# Patient Record
Sex: FEMALE
[De-identification: names, ages, dates, MRNs, and addresses within clinical notes are randomized per-mention and may not be internally consistent; named-entity substitution may affect disease eponyms.]

---

## 2018-04-23 PROBLEM — Z00.00 ENCOUNTER FOR PREVENTIVE HEALTH EXAMINATION: Status: ACTIVE | Noted: 2018-04-23

## 2018-04-30 ENCOUNTER — APPOINTMENT (OUTPATIENT)
Dept: ORTHOPEDIC SURGERY | Facility: CLINIC | Age: 78
End: 2018-04-30
Payer: MEDICARE

## 2018-04-30 VITALS
BODY MASS INDEX: 33.13 KG/M2 | HEART RATE: 55 BPM | DIASTOLIC BLOOD PRESSURE: 68 MMHG | SYSTOLIC BLOOD PRESSURE: 162 MMHG | HEIGHT: 62 IN | WEIGHT: 180 LBS

## 2018-04-30 DIAGNOSIS — Z87.39 PERSONAL HISTORY OF OTHER DISEASES OF THE MUSCULOSKELETAL SYSTEM AND CONNECTIVE TISSUE: ICD-10-CM

## 2018-04-30 PROCEDURE — 73564 X-RAY EXAM KNEE 4 OR MORE: CPT | Mod: 50

## 2018-04-30 PROCEDURE — 99214 OFFICE O/P EST MOD 30 MIN: CPT

## 2018-04-30 RX ORDER — LEVOTHYROXINE SODIUM 0.11 MG/1
112 TABLET ORAL
Qty: 30 | Refills: 0 | Status: ACTIVE | COMMUNITY
Start: 2017-11-02

## 2018-04-30 RX ORDER — ATENOLOL 50 MG/1
50 TABLET ORAL
Qty: 90 | Refills: 0 | Status: ACTIVE | COMMUNITY
Start: 2018-04-23

## 2018-04-30 RX ORDER — LEVOTHYROXINE SODIUM 50 UG/1
50 TABLET ORAL
Refills: 0 | Status: ACTIVE | COMMUNITY

## 2018-04-30 RX ORDER — CLONAZEPAM 0.5 MG/1
0.5 TABLET ORAL
Qty: 120 | Refills: 0 | Status: ACTIVE | COMMUNITY
Start: 2017-11-16

## 2018-04-30 RX ORDER — PREDNISONE 10 MG/1
10 TABLET ORAL
Qty: 90 | Refills: 0 | Status: ACTIVE | COMMUNITY
Start: 2018-01-23

## 2018-04-30 RX ORDER — ATENOLOL 25 MG/1
25 TABLET ORAL
Refills: 0 | Status: ACTIVE | COMMUNITY

## 2018-04-30 RX ORDER — ATENOLOL 25 MG/1
25 TABLET ORAL
Qty: 60 | Refills: 0 | Status: ACTIVE | COMMUNITY
Start: 2017-12-06

## 2018-04-30 RX ORDER — HYDROCHLOROTHIAZIDE 25 MG/1
25 TABLET ORAL
Qty: 36 | Refills: 0 | Status: ACTIVE | COMMUNITY
Start: 2017-12-06

## 2018-04-30 RX ORDER — METOPROLOL SUCCINATE 50 MG/1
50 TABLET, EXTENDED RELEASE ORAL
Qty: 30 | Refills: 0 | Status: ACTIVE | COMMUNITY
Start: 2017-09-28

## 2018-04-30 RX ORDER — OMEPRAZOLE 40 MG/1
40 CAPSULE, DELAYED RELEASE ORAL
Qty: 30 | Refills: 0 | Status: ACTIVE | COMMUNITY
Start: 2017-09-26

## 2018-06-04 ENCOUNTER — APPOINTMENT (OUTPATIENT)
Dept: ORTHOPEDIC SURGERY | Facility: CLINIC | Age: 78
End: 2018-06-04
Payer: MEDICARE

## 2018-06-04 VITALS — BODY MASS INDEX: 33.13 KG/M2 | WEIGHT: 180 LBS | HEIGHT: 62 IN

## 2018-06-04 PROCEDURE — 20610 DRAIN/INJ JOINT/BURSA W/O US: CPT | Mod: 50

## 2018-06-11 ENCOUNTER — RX RENEWAL (OUTPATIENT)
Age: 78
End: 2018-06-11

## 2018-06-11 ENCOUNTER — APPOINTMENT (OUTPATIENT)
Dept: ORTHOPEDIC SURGERY | Facility: CLINIC | Age: 78
End: 2018-06-11
Payer: MEDICARE

## 2018-06-11 PROCEDURE — 20610 DRAIN/INJ JOINT/BURSA W/O US: CPT | Mod: 50

## 2018-06-12 ENCOUNTER — RX RENEWAL (OUTPATIENT)
Age: 78
End: 2018-06-12

## 2018-06-12 RX ORDER — MELOXICAM 15 MG/1
15 TABLET ORAL DAILY
Qty: 30 | Refills: 2 | Status: ACTIVE | COMMUNITY
Start: 2017-11-22

## 2018-06-12 RX ORDER — MELOXICAM 7.5 MG/1
7.5 TABLET ORAL DAILY
Qty: 90 | Refills: 0 | Status: DISCONTINUED | COMMUNITY
Start: 2018-06-11 | End: 2018-06-12

## 2018-06-18 ENCOUNTER — APPOINTMENT (OUTPATIENT)
Dept: ORTHOPEDIC SURGERY | Facility: CLINIC | Age: 78
End: 2018-06-18
Payer: MEDICARE

## 2018-06-18 PROCEDURE — 20610 DRAIN/INJ JOINT/BURSA W/O US: CPT | Mod: 50

## 2018-11-19 ENCOUNTER — APPOINTMENT (OUTPATIENT)
Dept: ORTHOPEDIC SURGERY | Facility: CLINIC | Age: 78
End: 2018-11-19
Payer: MEDICARE

## 2018-11-19 VITALS
BODY MASS INDEX: 33.13 KG/M2 | WEIGHT: 180 LBS | DIASTOLIC BLOOD PRESSURE: 81 MMHG | SYSTOLIC BLOOD PRESSURE: 129 MMHG | HEART RATE: 48 BPM | HEIGHT: 62 IN

## 2018-11-19 PROCEDURE — 73564 X-RAY EXAM KNEE 4 OR MORE: CPT | Mod: 50

## 2018-11-19 PROCEDURE — 99213 OFFICE O/P EST LOW 20 MIN: CPT

## 2019-11-04 ENCOUNTER — APPOINTMENT (OUTPATIENT)
Dept: ORTHOPEDIC SURGERY | Facility: CLINIC | Age: 79
End: 2019-11-04
Payer: MEDICARE

## 2019-11-04 VITALS — BODY MASS INDEX: 33.13 KG/M2 | HEIGHT: 62 IN | WEIGHT: 180 LBS

## 2019-11-04 DIAGNOSIS — M17.0 BILATERAL PRIMARY OSTEOARTHRITIS OF KNEE: ICD-10-CM

## 2019-11-04 DIAGNOSIS — M25.562 PAIN IN RIGHT KNEE: ICD-10-CM

## 2019-11-04 DIAGNOSIS — M25.561 PAIN IN RIGHT KNEE: ICD-10-CM

## 2019-11-04 PROCEDURE — 99213 OFFICE O/P EST LOW 20 MIN: CPT

## 2019-11-04 PROCEDURE — 73564 X-RAY EXAM KNEE 4 OR MORE: CPT | Mod: 50

## 2019-11-04 NOTE — ADDENDUM
[FreeTextEntry1] : This note was written by Aniyah Luna on 11/04/2019 acting as scribe for Dr. Wilmer Kong M.D.\par \par I, Dr. Wilmer Kong M.D., have read and attest that all the information, medical decision making and discharge instructions within are true and accurate.

## 2019-11-04 NOTE — PHYSICAL EXAM
[de-identified] : Left Knee\par Inspection: trace effusion \par  Wounds: none. \par  Alignment: normal. \par  Palpation: no specific tenderness on palpation. \par  ROM: Active (in degrees) 0-110 crepitus through arc of motion \par  Ligamentous laxity (neg): all ligaments appear stable, negative ant. drawer test, negative post. drawer test, stable to varus stress test, stable to valgus stress test, negative Lachman's test, negative pivot shift test,\par  Meniscal Test: negative McMurrays, negative Julio. \par  Patellofemoral Alignment Test: Q angle-, normal. \par  Muscle Test: good quad strength. \par  Leg examination: calf is soft and non-tender. \par  \par Right knee\par Inspection: trace effusion \par  Wounds: none. \par  Alignment: normal. \par  Palpation: no specific tenderness on palpation. \par  ROM: Active (in degrees) 0-110 crepitus through arc of motion \par  Ligamentous laxity (neg): all ligaments appear stable, negative ant. drawer test, negative post. drawer test, stable to varus stress test, stable to valgus stress test, negative Lachman's test, negative pivot shift test,\par  Meniscal Test: negative McMurrays, negative Julio. \par  Patellofemoral Alignment Test: Q angle-, normal. \par  Muscle Test: good quad strength. \par  Leg examination: calf is soft and non-tender. [de-identified] : Right Knee xrays, taken at the office today show:, standing AP/Lateral and Merchant films, and 45 degree PA standing view, diffuse tricompartmental degenerative arthritis, lateral joint space narrowing especially on Leon view , Kellgren and Keagan grade 2\par \par Left Knee xrays,  taken at the office today show: standing AP/Lateral and Merchant films, and 45 degree PA standing view, diffuse tricompartmental degenerative arthritis, medial joint space narrowing, patellofemoral joint space narrowing, peripheral osteophytes, Kellgren and Keagan grade 2\par \par MRI Right Hip taken 2/12/2019: Report reviewed and is on chart. \par \par MRI Lumbar Spine taken 2/12/2019: Report reviewed and is on chart.

## 2019-11-04 NOTE — HISTORY OF PRESENT ILLNESS
[de-identified] : 79 year old female presents for follow up evaluation of bilateral knee pain. She underwent a series of Synvisc injections in June, which provided significant improvement of her bilateral knee pain. Patient has tried PT in the past, but finds more improvements with swimming and water aerobics. She uses anti-inflammatory medications from [facility] which provides improvement. She has a history of right hip pain and low back pain, for which she brought recent imaging results. She would like to repeat the viscosupplementation at this time.

## 2019-11-04 NOTE — DISCUSSION/SUMMARY
[de-identified] : Discussed at length the nature of the patients condition. Their bilateral knee symptoms are persisting secondary to degenerative arthritis. Since she has had a good prior response, we will seek authorization for another series of bilateral knee Synvisc injections. Once we receive authorization, we will proceed accordingly. She should continue with home exercise, including swimming and water aerobics. She may also continue with her anti-inflammatory medications. They can continue activities as tolerated. I have referred her to Dr. Fisher for evaluation of her varicose veins at patient's request.  I did explain that if in the future her pain becomes disabling, that she may to consider total knee replacement.

## 2019-11-20 ENCOUNTER — APPOINTMENT (OUTPATIENT)
Dept: VASCULAR SURGERY | Facility: CLINIC | Age: 79
End: 2019-11-20
Payer: MEDICARE

## 2019-11-20 VITALS
SYSTOLIC BLOOD PRESSURE: 141 MMHG | TEMPERATURE: 98.2 F | OXYGEN SATURATION: 96 % | HEART RATE: 56 BPM | DIASTOLIC BLOOD PRESSURE: 84 MMHG

## 2019-11-20 DIAGNOSIS — I78.1 NEVUS, NON-NEOPLASTIC: ICD-10-CM

## 2019-11-20 DIAGNOSIS — Z86.72 PERSONAL HISTORY OF THROMBOPHLEBITIS: ICD-10-CM

## 2019-11-20 DIAGNOSIS — I83.90 ASYMPTOMATIC VARICOSE VEINS OF UNSPECIFIED LOWER EXTREMITY: ICD-10-CM

## 2019-11-20 DIAGNOSIS — M71.20 SYNOVIAL CYST OF POPLITEAL SPACE [BAKER], UNSPECIFIED KNEE: ICD-10-CM

## 2019-11-20 PROCEDURE — 93970 EXTREMITY STUDY: CPT

## 2019-11-20 PROCEDURE — 99204 OFFICE O/P NEW MOD 45 MIN: CPT

## 2019-11-25 NOTE — ASSESSMENT
[Arterial/Venous Disease] : arterial/venous disease [FreeTextEntry1] : 80 y/o F with PMHx of superficial phlebitis (resolved) on bilateral LEs medial aspect and chronic LE edema. On exam, patient has areas of induration bilaterally to the medial aspect from her previous history of phlebitis. BLE Venous Duplex today shows that the R GSV is closed from an outside procedure. There is chronic SVT bilaterally that is now calcified. 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 Left tributary posterior GSV very superficial. Given the chronic findings, there is no concern for acute vasculopathies. No vascular intervention required at this time. To follow up here PRN.

## 2019-11-25 NOTE — ADDENDUM
[FreeTextEntry1] : Documented by Jordan Phipps acting as a scribe for Dr. Maykel Fisher on 11/20/2019\par

## 2019-11-25 NOTE — END OF VISIT
[FreeTextEntry3] : All medical record entries made by the Scribe were at my, Dr. Fisher's direction and personally dictated by me on 11/20/2019 I have reviewed the chart and agree that the record accurately reflects my personal performance of the history, physical exam, assessment and plan. I have also personally directed, reviewed, and agreed with the chart.\par

## 2019-11-25 NOTE — PHYSICAL EXAM
[Respiratory Effort] : normal respiratory effort [No Rash or Lesion] : No rash or lesion [Oriented to Place] : oriented to place [Oriented to Person] : oriented to person [Alert] : alert [Calm] : calm [Oriented to Time] : oriented to time [2+] : right 2+ [Varicose Veins Of Lower Extremities] : bilaterally [Ankle Swelling On The Right] : mild [Ankle Swelling (On Exam)] : not present [JVD] : no jugular venous distention  [] : not present [de-identified] : Well appearing, NAD [de-identified] : NCAT [de-identified] : FROM [FreeTextEntry1] : There is an area of induration to her BLEs along the medial aspect. Toes are warm and pink with good capillary refill. Multiple scattered dark spider veins across BLEs, mostly clustered to medial ankle.

## 2019-11-25 NOTE — PROCEDURE
[FreeTextEntry1] : BLE Venous Duplex today shows no signs of DVT or deep reflux bilaterally. The R GSV is closed from an outside procedure. There is chronic SVT bilaterally that is now calcified. 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 Left tributary posterior GSV very superficial.

## 2019-11-25 NOTE — CONSULT LETTER
[Dear  ___] : Dear  [unfilled], [Consult Closing:] : Thank you very much for allowing me to participate in the care of this patient.  If you have any questions, please do not hesitate to contact me. [FreeTextEntry1] : Thank you for referring Ms Felicity Luna for evaluation. She reports chronic lower extremity swelling controlled with daily use of compression stockings. She is concerned with the numerous bilateral calf veins.  She denies episodes of deep venous thrombosis or phlebitis except for some phlebitis when she was pregnant many years ago.  She has had recent injections in your office for chronic knee pain. \par \par On exam, there are indurated areas on both distal, medial thighs consistent with chronic superficial thrombosis. There are scattered non bulging varicose and spider veins throughout both legs with mild generalized swelling. Skin well moisturized. \par \par Venous duplex of lower extremities showed no signs of acute DVT or deep reflux. The right greater saphenous vein appears to be closed, and there are chronic SVT bilaterally that are now calcified. Also, Baker's cyst are noted on both popliteal fossas.\par \par Ms Luna has bilateral controlled chronic edema, calcified chronic superficial phlebitis affecting both legs and many asymptomatic varicose veins. I have advised her to continue using compression stockings. No interventions are required at this time. She should remain active and she may follow-up as needed. \par \par My complete EMR office note is below for your records. \par \par  [FreeTextEntry2] : Wilmer Kong MD\par [address]\[place] [address] \par \par Angel Brock MD\par [address]\[place] [address]\par \par Anil Sweet MD\par [address]\[place] [address] [FreeTextEntry3] : Sincerely, \par \par Maykel Fisher M.D. \par , [facility]\par , Department of Surgery [facility]\par Professor of Surgery, [employer] at [facility]\par

## 2019-11-25 NOTE — HISTORY OF PRESENT ILLNESS
[FreeTextEntry1] : 80 y/o F with no significant PMHx and a PSHx of hernia (unspecified) repair surgery presents here today for initial evaluation of her veins. She was referred here by Dr. Kong. She states that  has been treating her with injections on the knees given chronic pain. Denies any rest pain, claudication, blood clots or bleeding disorders. SHe does report a episode of phlebitis during one other pregnancies. Also, she reports controlled chronic swelling of LEs using compression stockings daily.   \par \par Pt is accompanied by  today.

## 2019-12-02 ENCOUNTER — APPOINTMENT (OUTPATIENT)
Dept: ORTHOPEDIC SURGERY | Facility: CLINIC | Age: 79
End: 2019-12-02

## 2019-12-09 ENCOUNTER — APPOINTMENT (OUTPATIENT)
Dept: ORTHOPEDIC SURGERY | Facility: CLINIC | Age: 79
End: 2019-12-09

## 2019-12-16 ENCOUNTER — APPOINTMENT (OUTPATIENT)
Dept: ORTHOPEDIC SURGERY | Facility: CLINIC | Age: 79
End: 2019-12-16

## 2021-01-13 ENCOUNTER — LABORATORY RESULT (OUTPATIENT)
Age: 81
End: 2021-01-13

## 2021-01-13 ENCOUNTER — APPOINTMENT (OUTPATIENT)
Dept: NEPHROLOGY | Facility: CLINIC | Age: 81
End: 2021-01-13
Payer: MEDICARE

## 2021-01-13 VITALS — HEART RATE: 60 BPM | SYSTOLIC BLOOD PRESSURE: 138 MMHG | DIASTOLIC BLOOD PRESSURE: 90 MMHG

## 2021-01-13 VITALS — OXYGEN SATURATION: 96 % | HEART RATE: 68 BPM | BODY MASS INDEX: 35.12 KG/M2 | TEMPERATURE: 99 F | WEIGHT: 192 LBS

## 2021-01-13 VITALS — SYSTOLIC BLOOD PRESSURE: 130 MMHG | DIASTOLIC BLOOD PRESSURE: 80 MMHG

## 2021-01-13 DIAGNOSIS — Z87.898 PERSONAL HISTORY OF OTHER SPECIFIED CONDITIONS: ICD-10-CM

## 2021-01-13 PROCEDURE — 99205 OFFICE O/P NEW HI 60 MIN: CPT | Mod: 25

## 2021-01-13 PROCEDURE — 36415 COLL VENOUS BLD VENIPUNCTURE: CPT

## 2021-01-13 PROCEDURE — 93000 ELECTROCARDIOGRAM COMPLETE: CPT

## 2021-01-13 NOTE — END OF VISIT
[Time Spent: ___ minutes] : I have spent [unfilled] minutes of time on the encounter. [FreeTextEntry3] : Documented by Kulwinder Cole acting as a scribe for Dr. Sonny Harrison on 01/13/2021.

## 2021-01-13 NOTE — REVIEW OF SYSTEMS
[Negative] : Heme/Lymph [As Noted in HPI] : as noted in HPI [Feeling Tired] : feeling tired [FreeTextEntry1] : ROS negative except as above, see HPI.

## 2021-01-13 NOTE — HISTORY OF PRESENT ILLNESS
[FreeTextEntry1] : The patient is a 80 year old female presenting for initial evaluation of fatigue. Patient's  was the primary historian.\par \par Chief Complaint: The patient reports a 2 month history of increased fatigue and daytime somnolence. She denies snoring. She and her  also detailed a past medical history including obesity, thyroid abnormality, and heart palpitations, as detailed below.\par \par Labs taken 12/15/20 reveal: negative COVID antibody, TSH 0.126, creatinine 0.68, eGFR 82, calcium 10.4, albumin 4.8, cholesterol 210, \par \par Past Medical History:\par - Obesity (currently weighs 192 lbs; past baseline of 160 lbs)\par - Diminished hearing (patient reportedly never evaluated in ENT)\par - heart palpitations (followed by Dr. Sweet)\par - Cognitive loss\par - Denies CAD, h/o MI\par - PCP is Dr. Angel Morataya. Followed in the past in orthopaedics by Dr. Kong\par \par Past Surgical History/Hospitalizations:\par - hernia surgery\par - 5 full-term vaginal deliveries\par \par Family History: \par - Parents \par - 1 brother  (unspecified cancer), 1 sister with cognitive loss; other siblings well.\par - Denies other pertinent FMHx\par \par Social History:\par - Patient , 5 children\par - Born in [facility], lived in the  since \par - Never smoker, no alcohol use\par - No pets, no recent travel\par \par Current Medications: Synthroid 100mcg QD, atenolol 50mg QD, Namenda 10mg BID\par \par Allergies: NKDA

## 2021-01-13 NOTE — ASSESSMENT
[FreeTextEntry1] : Plan:\par 1) HTN: BP acceptable today on current regimen. Will therefore not adjust patient's antihypertensive medications at this time but will reassess pressure and regimen at next evaluation.\par 2) EKG taken today reveals sinus bradycardia with nonspecific STT changes. Will contact Dr. Sweet to discuss patient's cardiac status given abnormal EKG and her h/o palpitations, and to request patient's past tracings.\par 3) Thyroid: Patient with TSH of 0.126 on most recent labs. Referred patient to endocrinology for thyroid evaluation.\par 4) Severe hearing loss: Referred patient to Dr. Atkins in ENT for hearing evaluation.\par 5) Pathologic fatigue / daytime somnolence: Referred patient to pulmonology for sleep test to r/o sleep disorder.\par 6) Cognitive loss: Patient with abnormal brain MRI (loss of cerebral volume). Will refer patient to Dr. Lee in neurology for further evaluation. Pending further evaluation, will likely have patient receive neurologic imaging, including lumbosacral spine MRI.\par 7) Obesity: Will form plan for further treatment pending further data to be revealed on labs.\par 8) Patient's  volunteers that the patient has developed urinary issues. Will refer patient to urology for further evaluation.\par 9) Well care: Patient reports a remote mammogram and remote colonoscopy of uncertain timing, as well as bone density test within the past year. Instructed that patient and her  bring patient's most recent reports and imaging to my office at next visit.\par \par Changes to medications: None at this time\par \par Labs were drawn and patient will return in 3-4 weeks for a follow-up appointment.

## 2021-01-13 NOTE — ADDENDUM
[FreeTextEntry1] : All medical record entries made by the Scribe were at my, Dr. Sonny Harrison, direction and personally dictated by me on 01/13/2021. I have reviewed the chart and agree that the record accurately reflects my personal performance of the history, physical exam, assessment and plan. I have also personally directed, reviewed, and agreed with the chart.

## 2021-01-13 NOTE — PHYSICAL EXAM
[General Appearance - Alert] : alert [Sclera] : the sclera and conjunctiva were normal [General Appearance - In No Acute Distress] : in no acute distress [PERRL With Normal Accommodation] : pupils were equal in size, round, and reactive to light [Extraocular Movements] : extraocular movements were intact [Outer Ear] : the ears and nose were normal in appearance [Examination Of The Oral Cavity] : the lips and gums were normal [Neck Appearance] : the appearance of the neck was normal [Neck Cervical Mass (___cm)] : no neck mass was observed [Jugular Venous Distention Increased] : there was no jugular-venous distention [Thyroid Nodule] : there were no palpable thyroid nodules [Thyroid Diffuse Enlargement] : the thyroid was not enlarged [Auscultation Breath Sounds / Voice Sounds] : lungs were clear to auscultation bilaterally [Heart Rate And Rhythm] : heart rate was normal and rhythm regular [Heart Sounds] : normal S1 and S2 [Heart Sounds Gallop] : no gallops [Murmurs] : no murmurs [Heart Sounds Pericardial Friction Rub] : no pericardial rub [Edema] : there was no peripheral edema [Bowel Sounds] : normal bowel sounds [Abdomen Soft] : soft [Abdomen Tenderness] : non-tender [Abdomen Mass (___ Cm)] : no abdominal mass palpated [No CVA Tenderness] : no ~M costovertebral angle tenderness [No Spinal Tenderness] : no spinal tenderness [Abnormal Walk] : normal gait [Involuntary Movements] : no involuntary movements were seen [Musculoskeletal - Swelling] : no joint swelling seen [Motor Tone] : muscle strength and tone were normal [Skin Color & Pigmentation] : normal skin color and pigmentation [Skin Turgor] : normal skin turgor [] : no rash [No Focal Deficits] : no focal deficits [Oriented To Time, Place, And Person] : oriented to person, place, and time [Impaired Insight] : insight and judgment were intact [Affect] : the affect was normal [FreeTextEntry1] : Patient notably hard of hearing

## 2021-01-14 LAB
25(OH)D3 SERPL-MCNC: 36.7 NG/ML
ALBUMIN SERPL ELPH-MCNC: 4.7 G/DL
ALP BLD-CCNC: 70 U/L
ALT SERPL-CCNC: 20 U/L
ANION GAP SERPL CALC-SCNC: 13 MMOL/L
APPEARANCE: CLEAR
AST SERPL-CCNC: 19 U/L
BACTERIA: NEGATIVE
BASOPHILS # BLD AUTO: 0.04 K/UL
BASOPHILS NFR BLD AUTO: 0.7 %
BILIRUB SERPL-MCNC: 0.4 MG/DL
BILIRUBIN URINE: NEGATIVE
BLOOD URINE: NEGATIVE
BUN SERPL-MCNC: 12 MG/DL
CALCIUM SERPL-MCNC: 10.2 MG/DL
CALCIUM SERPL-MCNC: 10.2 MG/DL
CHLORIDE SERPL-SCNC: 102 MMOL/L
CHOLEST SERPL-MCNC: 212 MG/DL
CO2 SERPL-SCNC: 27 MMOL/L
COLOR: NORMAL
CORTIS SERPL-MCNC: 9.2 UG/DL
CREAT SERPL-MCNC: 0.88 MG/DL
CRP SERPL-MCNC: 1 MG/DL
CYSTATIN C SERPL-MCNC: 1.31 MG/L
EOSINOPHIL # BLD AUTO: 0.08 K/UL
EOSINOPHIL NFR BLD AUTO: 1.3 %
ERYTHROCYTE [SEDIMENTATION RATE] IN BLOOD BY WESTERGREN METHOD: 32 MM/HR
ESTIMATED AVERAGE GLUCOSE: 117 MG/DL
ESTRADIOL SERPL-MCNC: 11 PG/ML
FERRITIN SERPL-MCNC: 99 NG/ML
FOLATE SERPL-MCNC: >20 NG/ML
FSH SERPL-MCNC: 42.3 IU/L
GFR/BSA.PRED SERPLBLD CYS-BASED-ARV: 47 ML/MIN
GLUCOSE QUALITATIVE U: NEGATIVE
GLUCOSE SERPL-MCNC: 98 MG/DL
HBA1C MFR BLD HPLC: 5.7 %
HCT VFR BLD CALC: 43.6 %
HDLC SERPL-MCNC: 71 MG/DL
HGB BLD-MCNC: 13.6 G/DL
HYALINE CASTS: 0 /LPF
IMM GRANULOCYTES NFR BLD AUTO: 0.2 %
IRON SATN MFR SERPL: 23 %
IRON SERPL-MCNC: 78 UG/DL
KETONES URINE: NEGATIVE
LDLC SERPL CALC-MCNC: 120 MG/DL
LEUKOCYTE ESTERASE URINE: NEGATIVE
LH SERPL-ACNC: 31.1 IU/L
LYMPHOCYTES # BLD AUTO: 1.6 K/UL
LYMPHOCYTES NFR BLD AUTO: 27 %
MAGNESIUM SERPL-MCNC: 2.2 MG/DL
MAN DIFF?: NORMAL
MCHC RBC-ENTMCNC: 29.9 PG
MCHC RBC-ENTMCNC: 31.2 GM/DL
MCV RBC AUTO: 95.8 FL
MICROSCOPIC-UA: NORMAL
MONOCYTES # BLD AUTO: 0.52 K/UL
MONOCYTES NFR BLD AUTO: 8.8 %
NEUTROPHILS # BLD AUTO: 3.68 K/UL
NEUTROPHILS NFR BLD AUTO: 62 %
NITRITE URINE: NEGATIVE
NONHDLC SERPL-MCNC: 142 MG/DL
NT-PROBNP SERPL-MCNC: 399 PG/ML
PARATHYROID HORMONE INTACT: 38 PG/ML
PH URINE: 5
PHOSPHATE SERPL-MCNC: 3.9 MG/DL
PLATELET # BLD AUTO: 271 K/UL
POTASSIUM SERPL-SCNC: 4.7 MMOL/L
PROLACTIN SERPL-MCNC: 11.9 NG/ML
PROT SERPL-MCNC: 7.2 G/DL
PROTEIN URINE: NEGATIVE
RBC # BLD: 4.55 M/UL
RBC # FLD: 14.1 %
RED BLOOD CELLS URINE: 0 /HPF
RHEUMATOID FACT SER QL: 10 IU/ML
SODIUM SERPL-SCNC: 142 MMOL/L
SPECIFIC GRAVITY URINE: 1.01
SQUAMOUS EPITHELIAL CELLS: 1 /HPF
T3FREE SERPL-MCNC: 2.28 PG/ML
T3RU NFR SERPL: 1.1 TBI
T4 FREE SERPL-MCNC: 1.2 NG/DL
T4 SERPL-MCNC: 7.9 UG/DL
TIBC SERPL-MCNC: 334 UG/DL
TRIGL SERPL-MCNC: 109 MG/DL
TSH SERPL-ACNC: 1.83 UIU/ML
UIBC SERPL-MCNC: 256 UG/DL
URATE SERPL-MCNC: 6.5 MG/DL
UROBILINOGEN URINE: NORMAL
VIT B12 SERPL-MCNC: 441 PG/ML
WBC # FLD AUTO: 5.93 K/UL
WHITE BLOOD CELLS URINE: 0 /HPF

## 2021-01-15 LAB
24R-OH-CALCIDIOL SERPL-MCNC: 57.8 PG/ML
ACTH SER-ACNC: 15.6 PG/ML
ALDOSTERONE SERUM: 8.7 NG/DL
APTT BLD: 37.8 SEC
DEPRECATED D DIMER PPP IA-ACNC: 167 NG/ML DDU
HBV SURFACE AB SER QL: NONREACTIVE
HBV SURFACE AG SER QL: NONREACTIVE
HCV AB SER QL: NONREACTIVE
HCV S/CO RATIO: 0.09 S/CO
HCYS SERPL-MCNC: 15.7 UMOL/L
IGF-1 INTERP: NORMAL
IGF-I BLD-MCNC: 118 NG/ML
INR PPP: 1.05 RATIO
PT BLD: 12.5 SEC
RENIN PLASMA: 4.1 PG/ML
THYROGLOB AB SERPL-ACNC: <20 IU/ML
THYROPEROXIDASE AB SERPL IA-ACNC: 12 IU/ML

## 2021-01-17 LAB
ANA SER IF-ACNC: NEGATIVE
C3 SERPL-MCNC: 144 MG/DL
C4 SERPL-MCNC: 30 MG/DL
DEPRECATED KAPPA LC FREE/LAMBDA SER: 1.19 RATIO
IGA 24H UR QL IFE: NORMAL
IGG4 SER-MCNC: 47 MG/DL
KAPPA LC CSF-MCNC: 1.8 MG/DL
KAPPA LC SERPL-MCNC: 2.14 MG/DL
KAPPA TOTAL LIGHT CHAIN, URINE: <0.9 MG/DL
KAPPA/LAMBDA TOTAL LIGHT CHAIN RATIO, URINE: NORMAL
LAMBDA TOTAL LIGHT CHAIN, URINE: <0.7 MG/DL
MPO AB + PR3 PNL SER: NORMAL
SARS-COV-2 IGG SERPL IA-ACNC: 0.07 INDEX
SARS-COV-2 IGG SERPL QL IA: NEGATIVE

## 2021-01-21 LAB
ALBUMIN MFR SERPL ELPH: 58.4 %
ALBUMIN SERPL-MCNC: 4.2 G/DL
ALBUMIN/GLOB SERPL: 1.4 RATIO
ALP BONE SERPL-MCNC: 9.6 UG/L
ALPHA1 GLOB MFR SERPL ELPH: 4 %
ALPHA1 GLOB SERPL ELPH-MCNC: 0.3 G/DL
ALPHA2 GLOB MFR SERPL ELPH: 11.4 %
ALPHA2 GLOB SERPL ELPH-MCNC: 0.8 G/DL
B-GLOBULIN MFR SERPL ELPH: 13.5 %
B-GLOBULIN SERPL ELPH-MCNC: 1 G/DL
C1Q IMMUNE COMPLEX: <1.2 UG EQ/ML
C3D IMMUNE COMPLEXES: 7.6 UG EQ/ML
COLLAGEN CTX SERPL-MCNC: 144 PG/ML
DEPRECATED KAPPA LC FREE/LAMBDA SER: 1.19 RATIO
GAMMA GLOB FLD ELPH-MCNC: 0.9 G/DL
GAMMA GLOB MFR SERPL ELPH: 12.7 %
IGA SER QL IEP: 353 MG/DL
IGG SER QL IEP: 889 MG/DL
IGM SER QL IEP: 52 MG/DL
INTERPRETATION SERPL IEP-IMP: NORMAL
KAPPA LC CSF-MCNC: 1.8 MG/DL
KAPPA LC SERPL-MCNC: 2.14 MG/DL
M PROTEIN SPEC IFE-MCNC: NORMAL
PROT SERPL-MCNC: 7.2 G/DL
PROT SERPL-MCNC: 7.2 G/DL
VIT A SERPL-MCNC: 59.2 UG/DL
VIT B1 SERPL-MCNC: 113.2 NMOL/L
VIT B2 SERPL-MCNC: 217 UG/L
VIT B6 SERPL-MCNC: 10.2 UG/L
VIT C SERPL-MCNC: <0.1 MG/DL

## 2021-01-26 ENCOUNTER — APPOINTMENT (OUTPATIENT)
Dept: NEPHROLOGY | Facility: CLINIC | Age: 81
End: 2021-01-26
Payer: MEDICARE

## 2021-01-26 DIAGNOSIS — E66.9 OBESITY, UNSPECIFIED: ICD-10-CM

## 2021-01-26 DIAGNOSIS — R41.3 OTHER AMNESIA: ICD-10-CM

## 2021-01-26 DIAGNOSIS — I10 ESSENTIAL (PRIMARY) HYPERTENSION: ICD-10-CM

## 2021-01-26 DIAGNOSIS — R53.83 OTHER FATIGUE: ICD-10-CM

## 2021-01-26 DIAGNOSIS — R40.0 SOMNOLENCE: ICD-10-CM

## 2021-01-26 PROCEDURE — 99443: CPT | Mod: 95

## 2021-01-26 NOTE — ADDENDUM
[FreeTextEntry1] :  Documented by Robbin Martines acting as a scribe for Dr. Sonny Harrison on 01/26/2021.

## 2021-01-26 NOTE — HISTORY OF PRESENT ILLNESS
[Home] : at home, [unfilled] , at the time of the visit. [Other Location: e.g. Home (Enter Location, City,State)___] : at [unfilled] [Spouse] : spouse [Verbal consent obtained from patient] : the patient, [unfilled] [FreeTextEntry1] : The patient is a 80 year old female presenting for f/u evaluation of HTN and fatigue, and active reassessment of obesity, heart palpitations, h/o MI, hypothyroidism, and cognitive loss.\par \par Patient feels generally well today. However, the patient c/o feeling very cold..  reports that they keep the house warm, but patient continues to feel cold. Denies fevers, cough, SOB. \par \par Labs from 1/13/21 reveal: HGB 13.6, HCT 43.6, proBNP 399, total cholesterol 212, , CRP 1.00, sed rate 32, A1C 5.7, homocysteine 15.7, PT 12.5, INR 1.05, APTT 37.8, Vit C <0.1, creatinine 0.88, eGFR 71.\par \par Current Medications: Synthroid 100mcg QD, atenolol 50mg QD, Namenda 10mg BID

## 2021-01-26 NOTE — ASSESSMENT
[FreeTextEntry1] : Plan:\par 1) HTN: Patient's BP is reportedly stable at home. Continue on current antihypertensive regimen. \par 2) CRI: last eGFR of 71 is stable for patient. Continue to monitor on repeat CMP. \par 3) Hypothyroidism: last thyroid profile was stable. Continue on current regimen. \par 4) Complaint of feeling cold: Patient is c/o feeling cold in her home even though they keep the heat on. There is nothing diagnostic or suggestive on her lab results that would indicate an etiology for her chills. Have advised patient to try eating and drinking warm foods and liquids, and to bundle up with more layers of clothing while in the home (and also outside the home as it is currently winter in [place]). Will continue to monitor as needed.\par 5) Vitamin C deficiency: Patient with low vitamin C on recent labs. Advised patient to either begin taking a vitamin C supplement or to eat more foods rich in citric acid such as veronica, oranges, and grapefruits.\par 6) Have reviewed patient's lab results with her and her  in extensive detail.\par \par Medication changes: no change\par \par Plan to reconvene with patient in 3-6 months as needed.

## 2021-01-26 NOTE — END OF VISIT
[Time Spent: ___ minutes] : I have spent [unfilled] minutes of time on the encounter. [FreeTextEntry3] : All medical record entries made by the Scribe were at my, Dr. Sonny Harrison, direction and personally dictated by me on 01/26/2021. I have reviewed the chart and agree that the record accurately reflects my personal performance of the history, physical exam, assessment and plan. I have also personally directed, reviewed, and agreed with the chart.

## 2021-01-31 LAB
MENADIONE SERPL-MCNC: 0.23 NG/ML
METHYLMALONATE SERPL-SCNC: 181 NMOL/L